# Patient Record
Sex: FEMALE | Race: OTHER | HISPANIC OR LATINO
[De-identification: names, ages, dates, MRNs, and addresses within clinical notes are randomized per-mention and may not be internally consistent; named-entity substitution may affect disease eponyms.]

---

## 2018-02-13 ENCOUNTER — TRANSCRIPTION ENCOUNTER (OUTPATIENT)
Age: 58
End: 2018-02-13

## 2019-08-20 PROBLEM — Z00.00 ENCOUNTER FOR PREVENTIVE HEALTH EXAMINATION: Status: ACTIVE | Noted: 2019-08-20

## 2019-08-28 ENCOUNTER — OUTPATIENT (OUTPATIENT)
Dept: OUTPATIENT SERVICES | Facility: HOSPITAL | Age: 59
LOS: 1 days | End: 2019-08-28

## 2019-08-28 ENCOUNTER — APPOINTMENT (OUTPATIENT)
Dept: PLASTIC SURGERY | Facility: CLINIC | Age: 59
End: 2019-08-28

## 2019-08-28 VITALS
HEIGHT: 57 IN | TEMPERATURE: 99.6 F | RESPIRATION RATE: 14 BRPM | BODY MASS INDEX: 28.26 KG/M2 | SYSTOLIC BLOOD PRESSURE: 127 MMHG | WEIGHT: 131 LBS | HEART RATE: 65 BPM | DIASTOLIC BLOOD PRESSURE: 72 MMHG

## 2019-08-28 DIAGNOSIS — Z78.9 OTHER SPECIFIED HEALTH STATUS: ICD-10-CM

## 2019-08-28 NOTE — HISTORY OF PRESENT ILLNESS
[FreeTextEntry1] : Ms Salas is a very pleasant 59 yo  female who presents today with concern of facial aging. In particular, she sites her excess upper eyelid skin, neck, jowls and worsening marionette lines with time. She is otherwise a healthy woman. She is a non-smoker. She previously underwent breast augmentation (she is not sure, but believes saline implants) and abdominoplasty in [place] in the early 2000s. She had no healing problems and no difficulty with anesthesia. She is a  and says that she will have no problem taking off work for a couple of weeks and light activity only for a couple of months, at least. She is here today with her daughter.

## 2019-08-30 DIAGNOSIS — Z01.89 ENCOUNTER FOR OTHER SPECIFIED SPECIAL EXAMINATIONS: ICD-10-CM

## 2024-07-30 ENCOUNTER — NON-APPOINTMENT (OUTPATIENT)
Age: 64
End: 2024-07-30

## 2024-07-31 ENCOUNTER — APPOINTMENT (OUTPATIENT)
Dept: GASTROENTEROLOGY | Facility: CLINIC | Age: 64
End: 2024-07-31
Payer: COMMERCIAL

## 2024-07-31 VITALS
WEIGHT: 142 LBS | BODY MASS INDEX: 30.63 KG/M2 | DIASTOLIC BLOOD PRESSURE: 64 MMHG | OXYGEN SATURATION: 97 % | SYSTOLIC BLOOD PRESSURE: 113 MMHG | TEMPERATURE: 97.1 F | HEART RATE: 79 BPM | HEIGHT: 57 IN

## 2024-07-31 DIAGNOSIS — Z80.9 FAMILY HISTORY OF MALIGNANT NEOPLASM, UNSPECIFIED: ICD-10-CM

## 2024-07-31 DIAGNOSIS — Z83.719 ENCOUNTER FOR SCREENING FOR MALIGNANT NEOPLASM OF COLON: ICD-10-CM

## 2024-07-31 DIAGNOSIS — R10.31 RIGHT LOWER QUADRANT PAIN: ICD-10-CM

## 2024-07-31 DIAGNOSIS — R10.13 EPIGASTRIC PAIN: ICD-10-CM

## 2024-07-31 DIAGNOSIS — Z12.11 ENCOUNTER FOR SCREENING FOR MALIGNANT NEOPLASM OF COLON: ICD-10-CM

## 2024-07-31 DIAGNOSIS — R10.32 RIGHT LOWER QUADRANT PAIN: ICD-10-CM

## 2024-07-31 DIAGNOSIS — K21.9 GASTRO-ESOPHAGEAL REFLUX DISEASE W/OUT ESOPHAGITIS: ICD-10-CM

## 2024-07-31 PROCEDURE — 99204 OFFICE O/P NEW MOD 45 MIN: CPT

## 2024-07-31 RX ORDER — PANTOPRAZOLE 40 MG/1
40 TABLET, DELAYED RELEASE ORAL DAILY
Qty: 30 | Refills: 2 | Status: ACTIVE | COMMUNITY
Start: 2024-07-31 | End: 1900-01-01

## 2024-07-31 RX ORDER — POLYETHYLENE GLYCOL 3350 AND ELECTROLYTES WITH LEMON FLAVOR 236; 22.74; 6.74; 5.86; 2.97 G/4L; G/4L; G/4L; G/4L; G/4L
236 POWDER, FOR SOLUTION ORAL
Qty: 1 | Refills: 0 | Status: ACTIVE | COMMUNITY
Start: 2024-07-31 | End: 1900-01-01

## 2024-07-31 RX ORDER — SIMETHICONE 125 MG/1
125 TABLET, CHEWABLE ORAL
Qty: 120 | Refills: 3 | Status: ACTIVE | COMMUNITY
Start: 2024-07-31 | End: 1900-01-01

## 2024-07-31 NOTE — ASSESSMENT
[FreeTextEntry1] : Abdominal Pain: The patient complains of abdominal pain. The patient is to avoid nonsteroidal anti-inflammatory drugs and aspirin.  I recommend a low FOD-MAP diet.  I recommend a trial of Dicyclomine 10 mg tablet PO 3 times a day PRN for the abdominal pain. Dyspepsia: The patient complains of dyspeptic symptoms.  The patient was advised to abide by an anti-gas (low FOD-MAP) diet.  The patient was given a pamphlet for anti-gas (low FOD-MAP).  The patient and I reviewed the anti-gas (low FOD-MAP) diet at length. The patient is to start on a trial of Simethicone one tablet 4 times a day p.r.n. abdominal pain and gas. GERD: The patient was advised to avoid late-night meals and dietary indiscretions.  The patient was advised to avoid fried and fatty foods.  The patient was advised to abide by an anti-GERD diet. The patient was given a pamphlet for anti-GERD.  The patient and I reviewed the anti-GERD diet at length. I recommend a trial of Pantoprazole 40 mg once a day x 3 months for the symptoms. Family History of GI Malignancy: The patient admits to a family history of GI problems. The patient's sister  of pancreatic cancer. Family History of Colon Polyps: The patient admits to a family history of GI problems. The patient's sister had a history of colonic polyps. Family History of Colonic Polyps: The patient has a family history of colonic polyps.  I recommend a colonoscopy to assess for colonic polyps.  The patient was told of the risks and benefits of the procedure.  The patient was told of the risks of perforation, emergency surgery, bleeding, infections and missed lesions.  The patient is to be on a clear liquid diet the entire day prior to the procedure. The patient is to complete the entire prescribed bowel prep the day prior to the procedure as directed.  The patient is told not to drive, drink alcohol, use recreational drugs, exercise, or work the day of the procedure.  The patient was told of the need for an escort to accompany the patient home after the procedure. The patient is aware that the procedure may be cancelled if they fail to follow the directions.  The patient agreed and will schedule for the procedure.The patient is to be n.p.o. after midnight and bowel prep was given.  The patient is to return for the procedure.  Colonoscopy: I recommend a colonoscopy to assess the symptoms and for colonic polyps.  The patient was told of the risks and benefits of the procedure.  The patient was told of the risks of perforation, emergency surgery, bleeding, infections and missed lesions.  The patient is to be on a clear liquid diet the entire day prior to the procedure. The patient is to complete the entire prescribed bowel prep the day prior to the procedure as directed.  The patient is told not to drive, drink alcohol, use recreational drugs, exercise, or work the day of the procedure.  The patient was told of the need for an escort to accompany the patient home after the procedure. The patient is aware that the procedure may be cancelled if they fail to follow the directions.  The patient agreed and will schedule for the procedure.  The patient is to be n.p.o. after midnight and bowel prep was given.  The patient is to return for the procedure. Upper Endoscopy: I recommend an upper endoscopy to assess for peptic ulcer disease versus esophagitis.  The patient was told of the risks and benefits of the procedure.  The patient was told of the risks of perforation, emergency surgery, bleeding, infections and missed lesions. The patient is told not to drive, drink alcohol, use recreational drugs, exercise, or work the day of the procedure.  The patient was told of the need for an escort to accompany the patient home after the procedure. The patient is aware that the procedure may be cancelled if they fail to follow the directions.  The patient agreed and will schedule for the procedure. The patient is to be n.p.o. after midnight.  The patient is to return for the procedure. Follow-up: The patient is to follow-up in the office in 4 weeks to reassess the symptoms. The patient was told to call the office if any further problems.

## 2024-07-31 NOTE — HISTORY OF PRESENT ILLNESS
[de-identified] : The upper endoscopy performed at the office on April 27, 2018 revealed a small hiatal hernia and mild diffuse bile gastritis. The pathology revealed distal esophagus with squamous mucosa with mild gastroesophageal reflux disease with no evidence of eosinophilic esophagitis or fungal microorganisms, gastric antral mucosa with chronic inactive gastritis with no evidence of intestinal metaplasia or dysplasia that was negative for Helicobacter pylori, gastric body mucosa with chronic inactive gastritis with no evidence of intestinal metaplasia or dysplasia that was negative for Helicobacter pylori and duodenal mucosa with normal villous architecture with mild chronic inflammation of the lamina propria with no evidence of intraepithelial lymphocytosis, celiac disease, Giardia or parasites.   [FreeTextEntry1] : The colonoscopy to the terminal ileum performed on April 18, 2018. The colonoscopy revealed a normal but very long and tortuous colon and small internal hemorrhoids. There were no polyps, masses, diverticulosis, AVMs or colitis noted.

## 2024-11-11 NOTE — ASU PATIENT PROFILE, ADULT - FALL HARM RISK - UNIVERSAL INTERVENTIONS
Bed in lowest position, wheels locked, appropriate side rails in place/Call bell, personal items and telephone in reach/Instruct patient to call for assistance before getting out of bed or chair/Non-slip footwear when patient is out of bed/[place] to call system/Physically safe environment - no spills, clutter or unnecessary equipment/Purposeful Proactive Rounding/Room/bathroom lighting operational, light cord in reach

## 2024-11-12 ENCOUNTER — RESULT REVIEW (OUTPATIENT)
Age: 64
End: 2024-11-12

## 2024-11-12 ENCOUNTER — OUTPATIENT (OUTPATIENT)
Dept: OUTPATIENT SERVICES | Facility: HOSPITAL | Age: 64
LOS: 1 days | End: 2024-11-12
Payer: COMMERCIAL

## 2024-11-12 ENCOUNTER — APPOINTMENT (OUTPATIENT)
Dept: GASTROENTEROLOGY | Facility: HOSPITAL | Age: 64
End: 2024-11-12

## 2024-11-12 ENCOUNTER — TRANSCRIPTION ENCOUNTER (OUTPATIENT)
Age: 64
End: 2024-11-12

## 2024-11-12 VITALS
SYSTOLIC BLOOD PRESSURE: 128 MMHG | RESPIRATION RATE: 15 BRPM | HEART RATE: 62 BPM | DIASTOLIC BLOOD PRESSURE: 70 MMHG | OXYGEN SATURATION: 98 %

## 2024-11-12 VITALS
HEART RATE: 61 BPM | HEIGHT: 57 IN | OXYGEN SATURATION: 95 % | DIASTOLIC BLOOD PRESSURE: 57 MMHG | SYSTOLIC BLOOD PRESSURE: 132 MMHG | TEMPERATURE: 98 F | RESPIRATION RATE: 13 BRPM | WEIGHT: 141.98 LBS

## 2024-11-12 DIAGNOSIS — Z12.11 ENCOUNTER FOR SCREENING FOR MALIGNANT NEOPLASM OF COLON: ICD-10-CM

## 2024-11-12 DIAGNOSIS — Z98.82 BREAST IMPLANT STATUS: Chronic | ICD-10-CM

## 2024-11-12 DIAGNOSIS — Z98.891 HISTORY OF UTERINE SCAR FROM PREVIOUS SURGERY: Chronic | ICD-10-CM

## 2024-11-12 DIAGNOSIS — K21.9 GASTRO-ESOPHAGEAL REFLUX DISEASE WITHOUT ESOPHAGITIS: ICD-10-CM

## 2024-11-12 PROCEDURE — G0105: CPT

## 2024-11-12 PROCEDURE — 43239 EGD BIOPSY SINGLE/MULTIPLE: CPT

## 2024-11-12 PROCEDURE — 88305 TISSUE EXAM BY PATHOLOGIST: CPT

## 2024-11-12 PROCEDURE — 88312 SPECIAL STAINS GROUP 1: CPT

## 2024-11-12 PROCEDURE — 45378 DIAGNOSTIC COLONOSCOPY: CPT

## 2024-11-12 PROCEDURE — 88312 SPECIAL STAINS GROUP 1: CPT | Mod: 26

## 2024-11-12 PROCEDURE — 88305 TISSUE EXAM BY PATHOLOGIST: CPT | Mod: 26

## 2024-11-12 RX ORDER — SODIUM CHLORIDE 9 MG/ML
500 INJECTION, SOLUTION INTRAMUSCULAR; INTRAVENOUS; SUBCUTANEOUS
Refills: 0 | Status: COMPLETED | OUTPATIENT
Start: 2024-11-12 | End: 2024-11-12

## 2024-11-12 RX ADMIN — SODIUM CHLORIDE 30 MILLILITER(S): 9 INJECTION, SOLUTION INTRAMUSCULAR; INTRAVENOUS; SUBCUTANEOUS at 09:20

## 2024-11-12 NOTE — ASU PREOP CHECKLIST - IV STARTED
Problem: At Risk for Falls  Goal: # Patient does not fall  Outcome: Outcome Not Met, Continue to Monitor  Goal: # Takes action to control fall-related risks  Outcome: Outcome Not Met, Continue to Monitor  Goal: # Verbalizes understanding of fall risk/precautions  Description: Document education using the patient education activity  Outcome: Outcome Not Met, Continue to Monitor     Problem: At Risk for Injury Due to Fall  Goal: # Patient does not fall  Outcome: Outcome Not Met, Continue to Monitor  Goal: # Takes action to control condition specific risks  Outcome: Outcome Not Met, Continue to Monitor  Goal: # Verbalizes understanding of fall-related injury personal risks  Description: Document education using the patient education activity  Outcome: Outcome Not Met, Continue to Monitor     Problem: Impaired Physical Mobility  Goal: # Bed mobility, ambulation, and ADLs are maintained or returned to baseline during hospitalization  Outcome: Outcome Not Met, Continue to Monitor      yes

## 2024-11-12 NOTE — ASU DISCHARGE PLAN (ADULT/PEDIATRIC) - FINANCIAL ASSISTANCE
[facility] provides services at a reduced cost to those who are determined to be eligible through [facility]’s financial assistance program. Information regarding [facility]’s financial assistance program can be found by going to [email or web address] or by calling [phone].

## 2024-11-12 NOTE — CHART NOTE - NSCHARTNOTEFT_GEN_A_CORE
History of Present Illness       The patient is a 63-year-old  female with past medical history significant for borderline diabetes mellitus who was referred to my office by Dr. Yadi Nagy for abdominal pain, dyspepsia and gastroesophageal reflux disease. The patient also admits to having a family history of colon polyps. The patient had a prior history of Helicobacter pylori, gastritis, s/p treatment with Omeclamox for 10 days for H. pylori eradication. The patient admits to a family history of GI problems. The patient's sister had a history of colonic polyps. The patient's sister had a history of pancreatic cancer.  I was asked to render an opinion for consultation for the above complaints. The patient states that she is feeling fine. The patient complains of occasional abdominal pain. The patient describes the abdominal pain as a crampy, burning, intermittent lower abdominal discomfort that is nonradiating in nature. The abdominal pain is unrelated to meals or passing gas or having bowel movements. The abdominal pain is unrelated to stress. The abdominal pain is described as mild in nature. The abdominal pain occurs at night and in the morning. The abdominal pain can occur at any time. The abdominal pain never has awakened the patient from sleep. The abdominal pain is not relieved with any medications. The abdominal pain is associated with abdominal gas and bloating. The patient denies any nausea or vomiting. The patient complains of gastroesophageal reflux disease but denies any dysphagia. The gastroesophageal reflux disease is worse after meals and late at night and in the early morning. The patient denies taking medications for the gastroesophageal reflux disease. The patient denies any atypical chest pain, shortness of breath or palpitations. The patient denies any diaphoresis. The patient denies any constipation or diarrhea. The patient has 1 to 3 bowel movements a day. The patient denies a change in bowel habits. The patient denies a change in caliber of stool. The patient denies having mucus discharge with the bowel movements. The patient denies any bright red blood per rectum, melena or hematemesis. The patient denies any rectal pain or rectal pruritus. The patient denies any weight loss or anorexia. The patient admits to gaining weight recently. She denies any fevers or chills. The patient denies any jaundice or pruritus. The patient complains of occasional lower back pain. The patient had an upper endoscopy performed at the office on 2018. The upper endoscopy was performed up to the level of the second portion of the duodenum. The upper endoscopy revealed a small hiatal hernia and mild diffuse bile gastritis. Biopsies were taken of the distal esophagus, antrum, body of stomach and duodenum to assess for esophagitis, gastritis and duodenitis. The pathology revealed distal esophagus with squamous mucosa with mild gastroesophageal reflux disease with no evidence of eosinophilic esophagitis or fungal microorganisms, gastric antral mucosa with chronic inactive gastritis with no evidence of intestinal metaplasia or dysplasia that was negative for Helicobacter pylori, gastric body mucosa with chronic inactive gastritis with no evidence of intestinal metaplasia or dysplasia that was negative for Helicobacter pylori and duodenal mucosa with normal villous architecture with mild chronic inflammation o f the lamina propria with no evidence of intraepithelial lymphocytosis, celiac disease, Giardia or parasites. The patient tolerated the procedures well. The patient had a prior history of Helicobacter pylori, gastritis. The patient completed a trial of Omeclamox for 10 days for H. pylori eradication. The patient had a colonoscopy to the terminal ileum performed on 2018. The colonoscopy revealed a normal but very long and tortuous colon and small internal hemorrhoids. There were no polyps, masses, diverticulosis, AVMs or colitis noted. The patient tolerated the procedures well. The patient denies having a recent upper endoscopy and colonoscopy performed by another gastroenterologist. The patient's last menstrual period was age 37. The patient's periods are regular at 28 to 30 days. The patient's menstrual periods are heavy for 3 days. The patient is a G4 3023. The patient's first menstrual period was at age 10. The patient admits to a family history of GI problems. The patient's sister had a history of colonic polyps. The patient's sister had a history of pancreatic cancer.  ?  (+) Prior smoking 3 cigsarettes a day x 10 years, stopped x35 years, (-) ETOH, (-) IVDA  ?  Physical Exam:  General Appearance: Overweight, no acute distress  ENT: nose clear, ears unremarkable  Eyes: No enteric sclera, conjunctiva clear.  Neck: Supple, without masses  Respiratory: Breath sounds equal and bilateral, no wheezing no rales or rhonchi  Cardiovascular: S1-S2 audible, no murmur, no rubs or gallops  GI: (+) BS, soft, nontender, no rebound, no guarding, no masses  Liver: liver edge palpated  Rectal: not done  Musculo-skeletal: Good motor strength, good range of motion, normal appearing extremities  Skin: Normal appearing skin, no jaundice, no rashes or nodules  Neurological: without focal motor or sensory deficits Patient is moving all extremities spontaneously and to command with normal muscle strength alert and oriented X3  Psychiatric: Good affect, not depressed, not anxious      Gastroenterology Summary    Upper Endoscopy: The upper endoscopy performed at the office on 2018 revealed a small hiatal hernia and mild diffuse bile gastritis. The pathology revealed distal esophagus with squamous mucosa with mild gastroesophageal reflux disease with no evidence of eosinophilic esophagitis or fungal microorganisms, gastric antral mucosa with chronic inactive gastritis with no evidence of intestinal metaplasia or dysplasia that was negative for Helicobacter pylori, gastric body mucosa with chronic inactive gastritis with no evidence of intestinal metaplasia or dysplasia that was negative for Helicobacter pylori and duodenal mucosa with normal villous architecture with mild chronic inflammation of the lamina propria with no evidence of intraepithelial lymphocytosis, celiac disease, Giardia or parasites.    Colonoscopy: The colonoscopy to the terminal ileum performed on 2018. The colonoscopy revealed a normal but very long and tortuous colon and small internal hemorrhoids. There were no polyps, masses, diverticulosis, AVMs or colitis noted.  ?  Surgical History  History of Abdominoplasty  History of Breast augmentation           ?  Social History  Does not use illicit drugs (V49.89) (Z78.9)  Non-smoker (V49.89) (Z78.9)  Social alcohol use (Z78.9)           ?  Current Meds  No Reported Medications           Allergies  No Known Allergies           ?  Vitals  Vital Signs  ?  Recorded: 13Lim1833 09:03AM  Systolic  113, RLE, Sitting  Diastolic  64, RLE, Sitting  Height  4 ft 9 in  Weight  142 lb  BMI Calculated  30.73 kg/m2  BSA Calculated  1.55 m2  Temperature  97.1 F, Temporal  Heart Rate  79  O2 Saturation  97 %, Room Air  FiO2 Flow Rate  0 L/min, Room Air           ?  Assessment  Abdominal pain, bilateral lower quadrant (789.03,789.04) (R10.31,R10.32)  GERD without esophagitis (530.81) (K21.9)  Dyspepsia (536.8) (R10.13)  Encounter for colonoscopy in patient with family history of colon polyps (V76.51,V18.51)  (Z12.11,Z83.719)  Family history of cancer (V16.9) (Z80.9)    Abdominal Pain: The patient complains of abdominal pain. The patient is to avoid nonsteroidal anti-inflammatory drugs and aspirin. I recommend a low FOD-MAP diet. I recommend a trial of Dicyclomine 10 mg tablet PO 3 times a day PRN for the abdominal pain.  Dyspepsia: The patient complains of dyspeptic symptoms. The patient was advised to abide by an anti-gas (low FOD-MAP) diet. The patient was given a pamphlet for anti-gas (low FOD-MAP). The patient and I reviewed the anti-gas (low FOD-MAP) diet at length. The patient is to start on a trial of Simethicone one tablet 4 times a day p.r.n. abdominal pain and gas.  GERD: The patient was advised to avoid late-night meals and dietary indiscretions. The patient was advised to avoid fried and fatty foods. The patient was advised to abide by an anti-GERD diet. The patient was given a pamphlet for anti-GERD. The patient and I reviewed the anti-GERD diet at length. I recommend a trial of Pantoprazole 40 mg once a day x 3 months for the symptoms.  Family History of GI Malignancy: The patient admits to a family history of GI problems. The patient's sister  of pancreatic cancer.  Family History of Colon Polyps: The patient admits to a family history of GI problems. The patient's sister had a history of colonic polyps.  Family History of Colonic Polyps: The patient has a family history of colonic polyps. I recommend a colonoscopy to assess for colonic polyps. The patient was told of the risks and benefits of the procedure. The patient was told of the risks of perforation, emergency surgery, bleeding, infections and missed lesions. The patient is to be on a clear liquid diet the entire day prior to the procedure. The patient is to complete the entire prescribed bowel prep the day prior to the procedure as directed. The patient is told not to drive, drink alcohol, use recreational drugs, exercise, or work the day of the procedure. The patient was told of the need for an escort to accompany the patient home after the procedure. The patient is aware that the procedure may be cancelled if they fail to follow the directions. The patient agreed and will schedule for the procedure.The patient is to be n.p.o. after midnight and bowel prep was given. The patient is to return for the procedure.  Colonoscopy: I recommend a colonoscopy to assess the symptoms and for colonic polyps. The patient was told of the risks and benefits of the procedure. The patient was told of the risks of perforation, emergency surgery, bleeding, infections and missed lesions. The patient is to be on a clear liquid diet the entire day prior to the procedure. The patient is to complete the entire prescribed bowel prep the day prior to the procedure as directed. The patient is told not to drive, drink alcohol, use recreational drugs, exercise, or work the day of the procedure. The patient was told of the need for an escort to accompany the patient home after the procedure. The patient is aware that the procedure may be cancelled if they fail to follow the directions. The patient agreed and will schedule for the procedure. The patient is to be n.p.o. after midnight and bowel prep was given. The patient is to return for the procedure.  Upper Endoscopy: I recommend an upper endoscopy to assess for peptic ulcer disease versus esophagitis. The patient was told of the risks and benefits of the procedure. The patient was told of the risks of perforation, emergency surgery, bleeding, infections and missed lesions. The patient is told not to drive, drink alcohol, use recreational drugs, exercise, or work the day of the procedure. The patient was told of the need for an escort to accompany the patient home after the procedure. The patient is aware that the procedure may be cancelled if they fail to follow the directions. The patient agreed and will schedule for the procedure. The patient is to be n.p.o. after midnight. The patient is to return for the procedure.  Follow-up: The patient is to follow-up in the office in 4 weeks to reassess the symptoms. The patient was told to call the office if any further problems.            ?  Plan  Dyspepsia  Start: Simethicone 125 MG Oral Tablet Chewable; CHEW AND SWALLOW 1 TABLET 4  TIMES DAILY, AFTER MEALS AND AT BEDTIME  Encounter for colonoscopy in patient with family history of colon polyps  Start: PEG-3350/Electrolytes 236 GM Oral Solution Reconstituted; MIX AS DIRECTED  AND DRINK OVER 4 HOURS, START AT 4PM  Colonoscopy Screening; Status:Hold For - Scheduling; Requested for:[id number];  GERD without esophagitis  Start: Pantoprazole Sodium 40 MG Oral Tablet Delayed Release; TAKE 1 TABLET DAILY  Upper GI Endoscopy; Status:Active; Requested for:[id number];           ?

## 2024-11-14 LAB — SURGICAL PATHOLOGY STUDY: SIGNIFICANT CHANGE UP

## 2024-12-13 ENCOUNTER — APPOINTMENT (OUTPATIENT)
Dept: GASTROENTEROLOGY | Facility: CLINIC | Age: 64
End: 2024-12-13
Payer: COMMERCIAL

## 2024-12-13 VITALS
SYSTOLIC BLOOD PRESSURE: 120 MMHG | OXYGEN SATURATION: 96 % | DIASTOLIC BLOOD PRESSURE: 73 MMHG | BODY MASS INDEX: 30.2 KG/M2 | WEIGHT: 140 LBS | TEMPERATURE: 97.4 F | HEIGHT: 57 IN | HEART RATE: 66 BPM

## 2024-12-13 DIAGNOSIS — K21.9 GASTRO-ESOPHAGEAL REFLUX DISEASE W/OUT ESOPHAGITIS: ICD-10-CM

## 2024-12-13 DIAGNOSIS — R10.13 EPIGASTRIC PAIN: ICD-10-CM

## 2024-12-13 DIAGNOSIS — K64.8 OTHER HEMORRHOIDS: ICD-10-CM

## 2024-12-13 DIAGNOSIS — K44.9 DIAPHRAGMATIC HERNIA W/OUT OBSTRUCTION OR GANGRENE: ICD-10-CM

## 2024-12-13 DIAGNOSIS — Z80.9 FAMILY HISTORY OF MALIGNANT NEOPLASM, UNSPECIFIED: ICD-10-CM

## 2024-12-13 PROCEDURE — 99214 OFFICE O/P EST MOD 30 MIN: CPT

## 2024-12-13 RX ORDER — HYDROCORTISONE 25 MG/G
2.5 CREAM TOPICAL 3 TIMES DAILY
Qty: 2 | Refills: 3 | Status: ACTIVE | COMMUNITY
Start: 2024-12-13 | End: 1900-01-01

## 2025-02-07 ENCOUNTER — APPOINTMENT (OUTPATIENT)
Dept: ULTRASOUND IMAGING | Facility: CLINIC | Age: 65
End: 2025-02-07
Payer: COMMERCIAL

## 2025-02-07 PROCEDURE — 76700 US EXAM ABDOM COMPLETE: CPT

## 2025-02-10 ENCOUNTER — NON-APPOINTMENT (OUTPATIENT)
Age: 65
End: 2025-02-10

## (undated) DEVICE — FORCEP BIOPSY 2.5MM DISP

## (undated) DEVICE — SENSOR O2 FINGER ADULT

## (undated) DEVICE — SNARE LOOP POLY DISP 30MM LOOP

## (undated) DEVICE — CLAMP BX HOT RAD JAW 3

## (undated) DEVICE — SYR LUER LOK 50CC

## (undated) DEVICE — RETRIEVER ROTH NET PLATINUM-UNIVERSAL

## (undated) DEVICE — MASK LRG MED AND HIGH O2 CONC M TO M 10FT

## (undated) DEVICE — CATH ELCTR GLIDE PRB 7FR

## (undated) DEVICE — BITE BLOCK ADULT 20 X 27MM (GREEN)

## (undated) DEVICE — STERIS DEFENDO 3-PIECE KIT (AIR/WATER, SUCTION & BIOPSY VALVES)

## (undated) DEVICE — FORCEP RADIAL JAW 4 W NDL 2.2MM 2.8MM 240CM ORANGE DISP

## (undated) DEVICE — NDL INJ SCLERO INTERJECT 23G

## (undated) DEVICE — TUBING CANNULA SALTER LABS NASAL ADULT 7FT

## (undated) DEVICE — VALVE ENDO SURESEAL II 0-5FR

## (undated) DEVICE — SOL INJ NS 0.9% 500ML 1-PORT

## (undated) DEVICE — TUBING MEDI-VAC W MAXIGRIP CONNECTORS 1/4"X6'

## (undated) DEVICE — TUBING IV SET GRAVITY 3Y 100" MACRO

## (undated) DEVICE — LUBRICATING JELLY ONESHOT 1.25OZ

## (undated) DEVICE — FORMALIN CUPS 10% BUFFERED

## (undated) DEVICE — SOLIDIFIER ISOLYZER 2000CC

## (undated) DEVICE — DRSG CURITY GAUZE SPONGE 4 X 4" 12-PLY

## (undated) DEVICE — ADAPTER ENDO CHNL SINGLE USE

## (undated) DEVICE — KIT ENDO PROCEDURE CUST W/VLV

## (undated) DEVICE — VENODYNE/SCD SLEEVE CALF MEDIUM